# Patient Record
Sex: MALE | Race: WHITE | NOT HISPANIC OR LATINO | Employment: FULL TIME | URBAN - METROPOLITAN AREA
[De-identification: names, ages, dates, MRNs, and addresses within clinical notes are randomized per-mention and may not be internally consistent; named-entity substitution may affect disease eponyms.]

---

## 2017-06-30 ENCOUNTER — GENERIC CONVERSION - ENCOUNTER (OUTPATIENT)
Dept: OTHER | Facility: OTHER | Age: 52
End: 2017-06-30

## 2018-01-16 NOTE — MISCELLANEOUS
History of Present Illness  TCM Communication [facility]: The patient is being contacted for follow-up after hospitalization and Unable to reach patient  Tried several times  Libra Barton called and left msg with MARKUS appt for 6/9/16 at 12:30 pm  There are no pending appts scheduled  He was hospitalized at [facility]  The date of admission: 05/30/2016, date of discharge: 06/02/2016  Diagnosis: Fever D/C dx - Flu; Fever  He was discharged to home  Medications were not reviewed today  He scheduled a follow up appointment  He did not schedule a follow up appointment  Counseling was provided to  Unable to reach patient  Communication performed and completed by Viky Lockhart      Active Problems    1  Acute sinusitis (461 9) (J01 90)   2  Foot pain, unspecified laterality   3  Internal derangement of ankle (718 97) (M24 9)   4  Internal derangement of left knee (717 9) (M23 92)   5  Knee pain (719 46) (M25 569)   6  Pain in joint of left hip (719 45) (M25 552)   7  Sacroiliitis (720 2) (M46 1)   8  Vertigo (780 4) (R42)    Past Medical History    1  History of Hand pain, unspecified laterality    Surgical History    1  History of Hand Surgery (733 00)    Family History  Brother    1  Family history of Heart disease (429 9) (I51 9)  Family History    2  Family history of cardiac disorder (V17 49) (Z82 49)   3  Family history of malignant neoplasm (V16 9) (Z80 9)    Social History    · Being A Social Drinker   · Denied: Drug Use (305 90)   · Never A Smoker    Current Meds   1  Diclofenac Sodium 50 MG Oral Tablet Delayed Release; one by mouth twice daily with   food; Therapy: [id number] to (Last Rx:07Apr2015)  Requested for: 07Apr2015 Ordered   2  Meclizine HCl - 25 MG Oral Tablet; Take 1 tablet twice daily Recorded   3  Tylenol PM Extra Strength TABS; TAKE 2 TABLETS AT BEDTIME Recorded    Allergies    1   Ibuprofen TABS    Future Appointments    Date/Time Provider Specialty Site   06/14/2016 04:30 PM Sade Boles DO Family Medicine [address] [place] [address]   Electronically signed by : Erin Navarro, ; Jun 14 2016  1:56PM EST                       (Author)    Electronically signed by : Monty Montero DO; Jun 21 2016  6:48PM EST                       (Author)